# Patient Record
Sex: FEMALE
[De-identification: names, ages, dates, MRNs, and addresses within clinical notes are randomized per-mention and may not be internally consistent; named-entity substitution may affect disease eponyms.]

---

## 2019-06-12 ENCOUNTER — HOSPITAL ENCOUNTER (INPATIENT)
Dept: HOSPITAL 88 - ER | Age: 69
LOS: 2 days | Discharge: HOME | DRG: 603 | End: 2019-06-14
Attending: INTERNAL MEDICINE | Admitting: INTERNAL MEDICINE
Payer: COMMERCIAL

## 2019-06-12 VITALS — DIASTOLIC BLOOD PRESSURE: 79 MMHG | SYSTOLIC BLOOD PRESSURE: 167 MMHG

## 2019-06-12 VITALS — SYSTOLIC BLOOD PRESSURE: 151 MMHG | DIASTOLIC BLOOD PRESSURE: 80 MMHG

## 2019-06-12 VITALS — HEIGHT: 65 IN | BODY MASS INDEX: 20 KG/M2 | WEIGHT: 120.06 LBS

## 2019-06-12 VITALS — DIASTOLIC BLOOD PRESSURE: 80 MMHG | SYSTOLIC BLOOD PRESSURE: 151 MMHG

## 2019-06-12 DIAGNOSIS — E11.40: ICD-10-CM

## 2019-06-12 DIAGNOSIS — L03.116: Primary | ICD-10-CM

## 2019-06-12 DIAGNOSIS — I10: ICD-10-CM

## 2019-06-12 DIAGNOSIS — E11.65: ICD-10-CM

## 2019-06-12 DIAGNOSIS — Z79.4: ICD-10-CM

## 2019-06-12 LAB
ANION GAP SERPL CALC-SCNC: 11.9 MMOL/L (ref 8–16)
BACTERIA URNS QL MICRO: (no result) /HPF
BASOPHILS # BLD AUTO: 0 10*3/UL (ref 0–0.1)
BASOPHILS NFR BLD AUTO: 0.7 % (ref 0–1)
BILIRUB UR QL: NEGATIVE
BUN SERPL-MCNC: 19 MG/DL (ref 7–26)
BUN/CREAT SERPL: 23 (ref 6–25)
CALCIUM SERPL-MCNC: 9.6 MG/DL (ref 8.4–10.2)
CHLORIDE SERPL-SCNC: 98 MMOL/L (ref 98–107)
CLARITY UR: (no result)
CO2 SERPL-SCNC: 25 MMOL/L (ref 22–29)
COLOR UR: YELLOW
DEPRECATED APTT PLAS QN: 27 SECONDS (ref 23.8–35.5)
DEPRECATED INR PLAS: 0.89
DEPRECATED NEUTROPHILS # BLD AUTO: 3.7 10*3/UL (ref 2.1–6.9)
DEPRECATED RBC URNS MANUAL-ACNC: (no result) /HPF (ref 0–5)
EGFRCR SERPLBLD CKD-EPI 2021: > 60 ML/MIN (ref 60–?)
EOSINOPHIL # BLD AUTO: 0.1 10*3/UL (ref 0–0.4)
EOSINOPHIL NFR BLD AUTO: 1.3 % (ref 0–6)
EPI CELLS URNS QL MICRO: (no result) /LPF
ERYTHROCYTE [DISTWIDTH] IN CORD BLOOD: 12.3 % (ref 11.7–14.4)
GLUCOSE SERPLBLD-MCNC: 336 MG/DL (ref 74–118)
HCT VFR BLD AUTO: 33.6 % (ref 34.2–44.1)
HGB BLD-MCNC: 11.6 G/DL (ref 12–16)
KETONES UR QL STRIP.AUTO: NEGATIVE
LEUKOCYTE ESTERASE UR QL STRIP.AUTO: NEGATIVE
LYMPHOCYTES # BLD: 1.4 10*3/UL (ref 1–3.2)
LYMPHOCYTES NFR BLD AUTO: 24.1 % (ref 18–39.1)
MCH RBC QN AUTO: 29.7 PG (ref 28–32)
MCHC RBC AUTO-ENTMCNC: 34.5 G/DL (ref 31–35)
MCV RBC AUTO: 85.9 FL (ref 81–99)
MONOCYTES # BLD AUTO: 0.4 10*3/UL (ref 0.2–0.8)
MONOCYTES NFR BLD AUTO: 6.8 % (ref 4.4–11.3)
NEUTS SEG NFR BLD AUTO: 66 % (ref 38.7–80)
NITRITE UR QL STRIP.AUTO: NEGATIVE
PLATELET # BLD AUTO: 200 X10E3/UL (ref 140–360)
POTASSIUM SERPL-SCNC: 3.9 MMOL/L (ref 3.5–5.1)
PROT UR QL STRIP.AUTO: NEGATIVE
PROTHROMBIN TIME: 12.5 SECONDS (ref 11.9–14.5)
RBC # BLD AUTO: 3.91 X10E6/UL (ref 3.6–5.1)
SODIUM SERPL-SCNC: 131 MMOL/L (ref 136–145)
SP GR UR STRIP: <=1.005 (ref 1.01–1.02)
UROBILINOGEN UR STRIP-MCNC: 0.2 MG/DL (ref 0.2–1)
WBC #/AREA URNS HPF: (no result) /HPF (ref 0–5)
YEAST URNS QL MICRO: (no result)

## 2019-06-12 PROCEDURE — 82948 REAGENT STRIP/BLOOD GLUCOSE: CPT

## 2019-06-12 PROCEDURE — 87086 URINE CULTURE/COLONY COUNT: CPT

## 2019-06-12 PROCEDURE — 81001 URINALYSIS AUTO W/SCOPE: CPT

## 2019-06-12 PROCEDURE — 85025 COMPLETE CBC W/AUTO DIFF WBC: CPT

## 2019-06-12 PROCEDURE — 93971 EXTREMITY STUDY: CPT

## 2019-06-12 PROCEDURE — 80048 BASIC METABOLIC PNL TOTAL CA: CPT

## 2019-06-12 PROCEDURE — 99284 EMERGENCY DEPT VISIT MOD MDM: CPT

## 2019-06-12 PROCEDURE — 36415 COLL VENOUS BLD VENIPUNCTURE: CPT

## 2019-06-12 PROCEDURE — 93005 ELECTROCARDIOGRAM TRACING: CPT

## 2019-06-12 PROCEDURE — 85610 PROTHROMBIN TIME: CPT

## 2019-06-12 PROCEDURE — 96372 THER/PROPH/DIAG INJ SC/IM: CPT

## 2019-06-12 PROCEDURE — 85730 THROMBOPLASTIN TIME PARTIAL: CPT

## 2019-06-12 RX ADMIN — SODIUM CHLORIDE SCH MLS/HR: 9 INJECTION, SOLUTION INTRAVENOUS at 13:05

## 2019-06-12 NOTE — NUR
PATIENT IS LAYING IN BED, ON ROOM AIR, NO S/S OF DISTRESS. SON AT THE BEDSIDE. FLUIDS 
RUNNING. BED IN LOWEST POSITION, SIDE RAILS UP X2, AND CALL BELL WITHIN REACH.

## 2019-06-12 NOTE — NUR
rec'd report from victor manuel from ER. rec'd patient from ER with son accompanying her. Sinhala 
speaking only and brought cane from home. patient on room air, IV to the left AC with no 
complications, fluids running at 125 ml/hr, no s/s of distress. patient provided home 
medications. patient has been non-complaint with medications. educated patient about what 
each of her medications are for and the risks she is taking by not taking her medications. 
patient does not have a primary doctor at the moment. side rails up x2, call bell within 
reach, and bed in lowest position.

## 2019-06-12 NOTE — NUR
Patient visited in room during nursing rounds. Patient alert and oriented x3. No distress or 
discomfort noted. Pt left leg with some redness and pt states feel some minor burning pain 
when ambulating on the left leg. Pt on IVF (NS at 125ml/hr) and on IV scheduled antibiotic 
(Cleocin). Family at bedside visiting. Call bell within reach. Will monitor closely.

## 2019-06-13 VITALS — DIASTOLIC BLOOD PRESSURE: 73 MMHG | SYSTOLIC BLOOD PRESSURE: 155 MMHG

## 2019-06-13 VITALS — SYSTOLIC BLOOD PRESSURE: 150 MMHG | DIASTOLIC BLOOD PRESSURE: 82 MMHG

## 2019-06-13 VITALS — DIASTOLIC BLOOD PRESSURE: 75 MMHG | SYSTOLIC BLOOD PRESSURE: 153 MMHG

## 2019-06-13 VITALS — DIASTOLIC BLOOD PRESSURE: 79 MMHG | SYSTOLIC BLOOD PRESSURE: 168 MMHG

## 2019-06-13 VITALS — DIASTOLIC BLOOD PRESSURE: 82 MMHG | SYSTOLIC BLOOD PRESSURE: 150 MMHG

## 2019-06-13 VITALS — SYSTOLIC BLOOD PRESSURE: 128 MMHG | DIASTOLIC BLOOD PRESSURE: 67 MMHG

## 2019-06-13 VITALS — SYSTOLIC BLOOD PRESSURE: 155 MMHG | DIASTOLIC BLOOD PRESSURE: 73 MMHG

## 2019-06-13 VITALS — SYSTOLIC BLOOD PRESSURE: 140 MMHG | DIASTOLIC BLOOD PRESSURE: 79 MMHG

## 2019-06-13 LAB
ANION GAP SERPL CALC-SCNC: 7.4 MMOL/L (ref 8–16)
ANION GAP SERPL CALC-SCNC: 9.9 MMOL/L (ref 8–16)
BASOPHILS # BLD AUTO: 0 10*3/UL (ref 0–0.1)
BASOPHILS NFR BLD AUTO: 0.7 % (ref 0–1)
BUN SERPL-MCNC: 13 MG/DL (ref 7–26)
BUN SERPL-MCNC: 13 MG/DL (ref 7–26)
BUN/CREAT SERPL: 18 (ref 6–25)
BUN/CREAT SERPL: 22 (ref 6–25)
CALCIUM SERPL-MCNC: 8.7 MG/DL (ref 8.4–10.2)
CALCIUM SERPL-MCNC: 8.8 MG/DL (ref 8.4–10.2)
CHLORIDE SERPL-SCNC: 101 MMOL/L (ref 98–107)
CHLORIDE SERPL-SCNC: 107 MMOL/L (ref 98–107)
CO2 SERPL-SCNC: 25 MMOL/L (ref 22–29)
CO2 SERPL-SCNC: 26 MMOL/L (ref 22–29)
DEPRECATED NEUTROPHILS # BLD AUTO: 3 10*3/UL (ref 2.1–6.9)
EGFRCR SERPLBLD CKD-EPI 2021: > 60 ML/MIN (ref 60–?)
EGFRCR SERPLBLD CKD-EPI 2021: > 60 ML/MIN (ref 60–?)
EOSINOPHIL # BLD AUTO: 0.1 10*3/UL (ref 0–0.4)
EOSINOPHIL NFR BLD AUTO: 1.6 % (ref 0–6)
ERYTHROCYTE [DISTWIDTH] IN CORD BLOOD: 12.5 % (ref 11.7–14.4)
GLUCOSE SERPLBLD-MCNC: 101 MG/DL (ref 74–118)
GLUCOSE SERPLBLD-MCNC: 320 MG/DL (ref 74–118)
HCT VFR BLD AUTO: 32.3 % (ref 34.2–44.1)
HGB BLD-MCNC: 11.2 G/DL (ref 12–16)
LYMPHOCYTES # BLD: 2.1 10*3/UL (ref 1–3.2)
LYMPHOCYTES NFR BLD AUTO: 36.9 % (ref 18–39.1)
MCH RBC QN AUTO: 29.4 PG (ref 28–32)
MCHC RBC AUTO-ENTMCNC: 34.7 G/DL (ref 31–35)
MCV RBC AUTO: 84.8 FL (ref 81–99)
MONOCYTES # BLD AUTO: 0.4 10*3/UL (ref 0.2–0.8)
MONOCYTES NFR BLD AUTO: 7.4 % (ref 4.4–11.3)
NEUTS SEG NFR BLD AUTO: 53.2 % (ref 38.7–80)
PLATELET # BLD AUTO: 186 X10E3/UL (ref 140–360)
POTASSIUM SERPL-SCNC: 3.4 MMOL/L (ref 3.5–5.1)
POTASSIUM SERPL-SCNC: 3.9 MMOL/L (ref 3.5–5.1)
RBC # BLD AUTO: 3.81 X10E6/UL (ref 3.6–5.1)
SODIUM SERPL-SCNC: 132 MMOL/L (ref 136–145)
SODIUM SERPL-SCNC: 137 MMOL/L (ref 136–145)

## 2019-06-13 RX ADMIN — CLINDAMYCIN PHOSPHATE SCH MLS/HR: 18 INJECTION, SOLUTION INTRAVENOUS at 12:43

## 2019-06-13 RX ADMIN — INSULIN LISPRO SCH UNIT: 100 INJECTION, SOLUTION INTRAVENOUS; SUBCUTANEOUS at 06:00

## 2019-06-13 RX ADMIN — INSULIN LISPRO SCH UNIT: 100 INJECTION, SOLUTION INTRAVENOUS; SUBCUTANEOUS at 00:33

## 2019-06-13 RX ADMIN — SODIUM CHLORIDE SCH MLS/HR: 9 INJECTION, SOLUTION INTRAVENOUS at 00:33

## 2019-06-13 RX ADMIN — SODIUM CHLORIDE SCH MLS/HR: 9 INJECTION, SOLUTION INTRAVENOUS at 20:24

## 2019-06-13 RX ADMIN — INSULIN LISPRO SCH UNIT: 100 INJECTION, SOLUTION INTRAVENOUS; SUBCUTANEOUS at 12:00

## 2019-06-13 RX ADMIN — INSULIN LISPRO SCH UNIT: 100 INJECTION, SOLUTION INTRAVENOUS; SUBCUTANEOUS at 18:44

## 2019-06-13 RX ADMIN — CLINDAMYCIN PHOSPHATE SCH MLS/HR: 18 INJECTION, SOLUTION INTRAVENOUS at 05:30

## 2019-06-13 RX ADMIN — CLINDAMYCIN PHOSPHATE SCH MLS/HR: 18 INJECTION, SOLUTION INTRAVENOUS at 20:24

## 2019-06-13 NOTE — NUR
PATIENT REC'D AAOX3, CANE AT THE BEDSIDE, FLUIDS RUNNING, ON ROOM AIR, NO S/S OF DISTRESS. 
CALL BELL WITHIN REACH, BED IN LOWEST POSITION, AND SIDE RAILS UP X2.

## 2019-06-13 NOTE — NUR
H&P



cc: redness of leg



HPI: 69yoF, PCP ??, developed redness of left leg with pain/swelling for 4 
days; 



PMH: DM2, DM-neuropathy, HTN, former smoker, osteoporosis, ambulatory 
dysfunction using cane.

PSHx:kyphoplasty

Allergies; see emr

Fh/SH; single; no eoth; quit cigs

Meds; see MAR

ROS: no f/c/s/N/V/D/SALDANA/cp/sob/vision changes/skin rash





v/s; revd

PE:

tired appearing

anicteric

ns1s2

mod bs

soft nt nd

left leg trace edema; erythema resolving; 

skin dry

n. affect



labs/med; revd



A/P: 69yoF

Cellulitis

HTN

DM2

Mild anemia

Hypokalemia

Hyponatremia

DM-neuropathy



PLAN: 

IV abx

recheck labs

plan to d/c on minocycline



He Muñiz MD, PhD.





He Muñiz MD, PhD.

## 2019-06-13 NOTE — NUR
Received bedside report from day shift RN. The patient is sitting up eating her meal. 
Patient is not in distress. Family members at bedside. Bed set low, call light within reach, 
side rails up x2 and is wearing non-skid socks.

## 2019-06-13 NOTE — NUR
PATIENT EVALUATED BY PT. PT ETHAN EVALUATED THAT PATIENT NEEDS WALKER AND NOT FOUR LEGGED 
CANE BECAUSE IT IS UNSAFE. PT PROVIDED WALKER FOR PATIENT WHILE BEING HOSPITALIZED. PATIENT 
HAS A WALKER AT THE HOUSE.

## 2019-06-13 NOTE — NUR
PATIENT IS RESTING QUIETLY WITH EYES OPENED. NO S/S OF DISTRESS. SIDE RAILS UP X2, BED IN 
LOWEST POSITION, AND CALL BELL WITHIN REACH.

## 2019-06-13 NOTE — NUR
Nutrition Screen Note



RD Recommendation for Physician: 

-Continue ADA diet as ordered

-RD provided education on diabetic diet as requested by family. 



Plan of Care: RD following, monitoring for tolerance and adequacy, diet education 



Nutrition reason for involvement:

Nutrition Risk Trigger  MST 



Primary Diagnose(s): LLE cellulitis, Type 2 DM with hyperglycemia 



PMH: diabetes 



Ht: 65in 

Wt: 120.06lb

BMI: 20.0kg/m2

IBW: 125lb



RD Assessment:

(6/13/2019) Chart reviewed. Labs and meds reviewed. 70yo F, who was admitted for LLE 
cellulitis with hyperglycemia. Visited pt in the room. Pt reported good appetite prior and 
during hospital stay. Weight has been stable. No complains of nausea or vomiting. LBM  
6/11. Pt denied any chewing or swallowing difficulty. Family requested more info on diabetic 
diet. All questions have been answered. Will continue to monitor and follow. 



Current Diet: ADA 1800



Malnutrition Evaluation (6/13/2019)

The patient does not meet criteria for a specified degree of malnutrition at this time. Will 
re-evaluate at follow-up as appropriate. 



Diet Education Needs Assessment:

Diet education indicated. 



Learner(s): pt and family 

Time spent: 25minutes 

Barriers: No barriers identified.

Cultural/Language Modifications: No cultural/language modifications noted.  Pt and family 
speak English. 

Readiness: Pt eager to learn. 

Method: Explanation, handouts 

Topics: Carbohydrate counting handouts, Reading the nutrition label, meal planning tips, 
servings/portion sizes

Understanding/Compliance: Expect good understanding/compliance from pt. Will benefit from 
reinforcement. All questions have been answered. 





Nutrition Care Level: low 





Signed: Marilee Genao, MS, RD, LD

## 2019-06-14 VITALS — DIASTOLIC BLOOD PRESSURE: 60 MMHG | SYSTOLIC BLOOD PRESSURE: 132 MMHG

## 2019-06-14 VITALS — DIASTOLIC BLOOD PRESSURE: 77 MMHG | SYSTOLIC BLOOD PRESSURE: 163 MMHG

## 2019-06-14 VITALS — SYSTOLIC BLOOD PRESSURE: 132 MMHG | DIASTOLIC BLOOD PRESSURE: 60 MMHG

## 2019-06-14 VITALS — SYSTOLIC BLOOD PRESSURE: 132 MMHG | DIASTOLIC BLOOD PRESSURE: 76 MMHG

## 2019-06-14 VITALS — DIASTOLIC BLOOD PRESSURE: 73 MMHG | SYSTOLIC BLOOD PRESSURE: 137 MMHG

## 2019-06-14 RX ADMIN — SODIUM CHLORIDE SCH MLS/HR: 9 INJECTION, SOLUTION INTRAVENOUS at 04:25

## 2019-06-14 RX ADMIN — INSULIN LISPRO SCH UNIT: 100 INJECTION, SOLUTION INTRAVENOUS; SUBCUTANEOUS at 00:00

## 2019-06-14 RX ADMIN — CLINDAMYCIN PHOSPHATE SCH MLS/HR: 18 INJECTION, SOLUTION INTRAVENOUS at 04:25

## 2019-06-14 RX ADMIN — INSULIN LISPRO SCH UNIT: 100 INJECTION, SOLUTION INTRAVENOUS; SUBCUTANEOUS at 05:29

## 2019-06-14 RX ADMIN — INSULIN LISPRO SCH UNIT: 100 INJECTION, SOLUTION INTRAVENOUS; SUBCUTANEOUS at 11:47

## 2019-06-14 NOTE — NUR
CM TO BEDSIDE TO DISCUSS IMM AND PATIENT'S RIGHTS TO MAKE DECISION IN HER CARE. CM ANSWERED 
QUESTIONS - PATIENT VERBALIZED UNDERSTANDING. COPY OF IMM LEFT AT THE BEDSIDE AND SIGNED 
COPY TO CHART.

## 2019-06-14 NOTE — DISCHARGE SUMMARY
FINAL DISCHARGE DIAGNOSES:  

1. Left lower extremity cellulitis.

2. Type 2 diabetes.

3. Hypertension.

4. Peripheral neuropathy.

 

CONSULTANTS:  None.

 

VITAL SIGNS:  Temperature is 96.4, pulse 81, respiratory rate is 18, blood pressure

132/60, and pulse ox 97% on room air. 

 

LABORATORY DATA:  Lab findings show white count 5.6, hemoglobin 11.2, hematocrit 33

platelets of 186.  Coagulation; PT 12, INR 0.89, PTT 27.  Chemistry; sodium 132,

potassium 3.9, chloride 101, bicarb 29, anion gap of 9.9, BUN 13, creatinine is 0.71,

glucose was 223, calcium is 8.7.  Urinalysis was found to be within normal range. 

 

MICROBIOLOGY:  Urine cultures were found to be contaminated.

 

IMAGING STUDIES:  Left lower extremity venous Doppler found to be negative for any DVT.

 

HOSPITAL COURSE:  This is a 69-year-old  female, who comes in with worsening

left lower extremity redness and erythema and was treated for underlying cellulitis.

The patient was on IV antibiotics while here in the hospital stay.  Her urine culture

was found to be a contaminant.  I discussed this case with her PCP, Dr. He Muñiz, who

recommends oral minocycline 100 mg p.o. b.i.d. for 10 total days and she needs to follow

up in his office in 1-week time.  The patient's left lower extremity improved

tremendously while here in the hospital stay.  On the day of discharge, vital signs

stable, labs reviewed and stable.  The patient was seen and evaluated, examined

thoroughly on the day of discharge, no other complaints.  The patient verbalized

understanding and agreed with plan of care, followup appointment as an outpatient with

the primary care physician in 1 week. 

 

MEDICATIONS:  See med reconciliation form including minocycline 100 mg p.o. b.i.d. x10

days. 

 

DISPOSITION:  Home.

 

CONDITION:  Stable.

 

DIET:  Heart healthy. 

 

In the event of any worsening symptoms, the patient was advised to come back to the ED

for further evaluation. 

 

Discharge summary took greater than 35 minutes.

 

 

 

 

______________________________

MD RITA Harris/JUDE

D:  06/14/2019 12:43:15

T:  06/14/2019 13:46:22

Job #:  [id number]

## 2019-06-14 NOTE — NUR
RECEIVED DC ORDER FROM MD, PATIENT IS IN STABLE CONDITION. IV LINE TO LEFT AC DC'D WITH TIP 
INTACT, PRESSURE APPLIED TO SITE, NO BLEEDING NOTED. DISCHARGE EDUCATION PROVIDED TO PATIENT 
AND FAMILY. DISCHARGE FOLDER WITH PRESCRIPTIONS AND DC PAPERWORK ON HAND. PERSONAL ITEMS ON 
HAND. PATIENT ACCOMPANIED TO PRIVATE AUTO VIA WHEELCHAIR BY STAFF.

## 2019-06-14 NOTE — NUR
RECEIVED PATIENT RESTING IN BED. NO ACUTE DISTRESS NOTED. DENIES PAIN OR DISCOMFORT AT THIS 
TIME. CALL LIGHT WITHIN REACH. BED IN THE LOWEST POSITION.